# Patient Record
Sex: FEMALE | Race: WHITE | Employment: PART TIME | URBAN - METROPOLITAN AREA
[De-identification: names, ages, dates, MRNs, and addresses within clinical notes are randomized per-mention and may not be internally consistent; named-entity substitution may affect disease eponyms.]

---

## 2022-06-28 PROBLEM — R10.9 ABDOMINAL PAIN: Status: ACTIVE | Noted: 2022-06-28

## 2022-06-28 PROBLEM — F41.9 ANXIETY: Status: ACTIVE | Noted: 2022-06-28

## 2022-07-11 PROBLEM — Z87.820 HISTORY OF TRAUMATIC BRAIN INJURY: Status: ACTIVE | Noted: 2022-07-11

## 2022-07-11 PROBLEM — K91.2 POSTOPERATIVE INTESTINAL MALABSORPTION: Status: ACTIVE | Noted: 2022-07-11

## 2022-07-11 PROBLEM — Z98.84 S/P GASTRIC BYPASS: Status: ACTIVE | Noted: 2022-07-11

## 2022-07-11 PROBLEM — M79.89 BILATERAL SWELLING OF FEET: Status: ACTIVE | Noted: 2022-07-11

## 2022-07-11 PROBLEM — H53.9 VISION DISTURBANCE: Status: ACTIVE | Noted: 2022-07-11

## 2022-07-11 PROBLEM — K21.9 GASTROESOPHAGEAL REFLUX DISEASE: Status: ACTIVE | Noted: 2022-07-11

## 2022-07-18 PROBLEM — R10.10 PAIN OF UPPER ABDOMEN: Status: ACTIVE | Noted: 2022-07-18

## 2022-08-16 PROBLEM — R10.30 LOWER ABDOMINAL PAIN: Status: ACTIVE | Noted: 2022-08-16

## 2022-08-16 PROBLEM — K56.600 PARTIAL INTESTINAL OBSTRUCTION (HCC): Status: ACTIVE | Noted: 2022-08-16

## 2022-08-16 PROBLEM — R10.30 LOWER ABDOMINAL PAIN: Chronic | Status: ACTIVE | Noted: 2022-08-16

## 2022-09-06 PROBLEM — K56.600 PARTIAL INTESTINAL OBSTRUCTION (HCC): Status: RESOLVED | Noted: 2022-08-16 | Resolved: 2022-09-06

## 2022-09-06 PROBLEM — G43.709 CHRONIC MIGRAINE WITHOUT AURA WITHOUT STATUS MIGRAINOSUS, NOT INTRACTABLE: Status: ACTIVE | Noted: 2022-09-06

## 2022-09-06 PROBLEM — R10.10 PAIN OF UPPER ABDOMEN: Status: RESOLVED | Noted: 2022-07-18 | Resolved: 2022-09-06

## 2022-09-06 PROBLEM — R10.9 ABDOMINAL DISCOMFORT: Status: RESOLVED | Noted: 2022-06-28 | Resolved: 2022-09-06

## 2022-09-06 PROBLEM — F41.1 GAD (GENERALIZED ANXIETY DISORDER): Status: ACTIVE | Noted: 2022-09-06

## 2022-09-06 PROBLEM — I49.9 IRREGULAR HEART RATE: Status: ACTIVE | Noted: 2022-09-06

## 2023-06-27 ENCOUNTER — TELEPHONE (OUTPATIENT)
Dept: ADMINISTRATIVE | Age: 43
End: 2023-06-27

## 2023-07-12 PROBLEM — R00.2 PALPITATIONS: Status: ACTIVE | Noted: 2023-07-12

## 2023-08-21 ENCOUNTER — HOSPITAL ENCOUNTER (EMERGENCY)
Age: 43
Discharge: HOME | End: 2023-08-21
Attending: EMERGENCY MEDICINE
Payer: COMMERCIAL

## 2023-08-21 ENCOUNTER — APPOINTMENT (OUTPATIENT)
Dept: GENERAL RADIOLOGY | Age: 43
End: 2023-08-21
Payer: COMMERCIAL

## 2023-08-21 VITALS
DIASTOLIC BLOOD PRESSURE: 68 MMHG | TEMPERATURE: 97.7 F | OXYGEN SATURATION: 98 % | SYSTOLIC BLOOD PRESSURE: 106 MMHG | HEART RATE: 69 BPM | BODY MASS INDEX: 25.25 KG/M2 | WEIGHT: 171 LBS | RESPIRATION RATE: 20 BRPM

## 2023-08-21 DIAGNOSIS — R00.2 PALPITATIONS: ICD-10-CM

## 2023-08-21 DIAGNOSIS — R06.89 DYSPNEA AND RESPIRATORY ABNORMALITIES: Primary | ICD-10-CM

## 2023-08-21 DIAGNOSIS — I49.3 PVC (PREMATURE VENTRICULAR CONTRACTION): ICD-10-CM

## 2023-08-21 DIAGNOSIS — R06.00 DYSPNEA AND RESPIRATORY ABNORMALITIES: Primary | ICD-10-CM

## 2023-08-21 LAB
ALBUMIN SERPL-MCNC: 4.6 G/DL (ref 3.5–5.2)
ALP SERPL-CCNC: 146 U/L (ref 35–104)
ALT SERPL-CCNC: 11 U/L (ref 0–32)
AMPHET UR QL SCN: POSITIVE
AMYLASE SERPL-CCNC: 49 U/L (ref 20–100)
ANION GAP SERPL CALCULATED.3IONS-SCNC: 11 MMOL/L (ref 7–16)
AST SERPL-CCNC: 11 U/L (ref 0–31)
BARBITURATES UR QL SCN: NEGATIVE
BENZODIAZ UR QL: NEGATIVE
BILIRUB DIRECT SERPL-MCNC: 0.3 MG/DL (ref 0–0.3)
BILIRUB INDIRECT SERPL-MCNC: 0.6 MG/DL (ref 0–1)
BILIRUB SERPL-MCNC: 0.9 MG/DL (ref 0–1.2)
BILIRUB UR QL STRIP: ABNORMAL
BNP SERPL-MCNC: 72 PG/ML (ref 0–125)
BUN SERPL-MCNC: 11 MG/DL (ref 6–20)
BUPRENORPHINE UR QL: NEGATIVE
CALCIUM SERPL-MCNC: 9.8 MG/DL (ref 8.6–10.2)
CANNABINOIDS UR QL SCN: NEGATIVE
CHLORIDE SERPL-SCNC: 102 MMOL/L (ref 98–107)
CK SERPL-CCNC: 42 U/L (ref 20–180)
CLARITY UR: CLEAR
CO2 SERPL-SCNC: 26 MMOL/L (ref 22–29)
COCAINE UR QL SCN: NEGATIVE
COLOR UR: YELLOW
CREAT SERPL-MCNC: 0.9 MG/DL (ref 0.5–1)
D DIMER: 232 NG/ML DDU (ref 0–232)
EPI CELLS #/AREA URNS HPF: ABNORMAL /HPF
ERYTHROCYTE [DISTWIDTH] IN BLOOD BY AUTOMATED COUNT: 11.9 % (ref 11.5–15)
FENTANYL UR QL: NEGATIVE
GFR SERPL CREATININE-BSD FRML MDRD: >60 ML/MIN/1.73M2
GLUCOSE SERPL-MCNC: 79 MG/DL (ref 74–99)
GLUCOSE UR STRIP-MCNC: NEGATIVE MG/DL
HCG SERPL QL: NEGATIVE
HCG UR QL: NEGATIVE
HCT VFR BLD AUTO: 39.9 % (ref 34–48)
HGB BLD-MCNC: 13.5 G/DL (ref 11.5–15.5)
HGB UR QL STRIP.AUTO: NEGATIVE
INR PPP: 1.1
KETONES UR STRIP-MCNC: 40 MG/DL
LACTATE BLDV-SCNC: 0.9 MMOL/L (ref 0.5–2.2)
LEUKOCYTE ESTERASE UR QL STRIP: NEGATIVE
LIPASE SERPL-CCNC: 47 U/L (ref 13–60)
MAGNESIUM SERPL-MCNC: 2 MG/DL (ref 1.6–2.6)
MCH RBC QN AUTO: 32.8 PG (ref 26–35)
MCHC RBC AUTO-ENTMCNC: 33.8 G/DL (ref 32–34.5)
MCV RBC AUTO: 96.8 FL (ref 80–99.9)
METHADONE UR QL: NEGATIVE
MUCOUS THREADS URNS QL MICRO: PRESENT
NITRITE UR QL STRIP: NEGATIVE
OPIATES UR QL SCN: NEGATIVE
OXYCODONE UR QL SCN: NEGATIVE
PCP UR QL SCN: NEGATIVE
PH UR STRIP: 5.5 [PH] (ref 5–9)
PLATELET # BLD AUTO: 222 K/UL (ref 130–450)
PMV BLD AUTO: 10.5 FL (ref 7–12)
POTASSIUM SERPL-SCNC: 4.4 MMOL/L (ref 3.5–5)
PROT SERPL-MCNC: 7.8 G/DL (ref 6.4–8.3)
PROT UR STRIP-MCNC: NEGATIVE MG/DL
PROTHROMBIN TIME: 12.3 SEC (ref 9.3–12.4)
RBC # BLD AUTO: 4.12 M/UL (ref 3.5–5.5)
RBC #/AREA URNS HPF: ABNORMAL /HPF
SODIUM SERPL-SCNC: 139 MMOL/L (ref 132–146)
SP GR UR STRIP: >1.03 (ref 1–1.03)
TEST INFORMATION: ABNORMAL
TROPONIN I SERPL HS-MCNC: <6 NG/L (ref 0–9)
UROBILINOGEN UR STRIP-ACNC: 1 EU/DL (ref 0–1)
WBC #/AREA URNS HPF: ABNORMAL /HPF
WBC OTHER # BLD: 5.1 K/UL (ref 4.5–11.5)

## 2023-08-21 PROCEDURE — 83880 ASSAY OF NATRIURETIC PEPTIDE: CPT

## 2023-08-21 PROCEDURE — 84703 CHORIONIC GONADOTROPIN ASSAY: CPT

## 2023-08-21 PROCEDURE — 82550 ASSAY OF CK (CPK): CPT

## 2023-08-21 PROCEDURE — 85610 PROTHROMBIN TIME: CPT

## 2023-08-21 PROCEDURE — 85027 COMPLETE CBC AUTOMATED: CPT

## 2023-08-21 PROCEDURE — 80307 DRUG TEST PRSMV CHEM ANLYZR: CPT

## 2023-08-21 PROCEDURE — 71045 X-RAY EXAM CHEST 1 VIEW: CPT

## 2023-08-21 PROCEDURE — 80053 COMPREHEN METABOLIC PANEL: CPT

## 2023-08-21 PROCEDURE — 85379 FIBRIN DEGRADATION QUANT: CPT

## 2023-08-21 PROCEDURE — 82150 ASSAY OF AMYLASE: CPT

## 2023-08-21 PROCEDURE — 99285 EMERGENCY DEPT VISIT HI MDM: CPT

## 2023-08-21 PROCEDURE — 83735 ASSAY OF MAGNESIUM: CPT

## 2023-08-21 PROCEDURE — 83605 ASSAY OF LACTIC ACID: CPT

## 2023-08-21 PROCEDURE — 81001 URINALYSIS AUTO W/SCOPE: CPT

## 2023-08-21 PROCEDURE — 83690 ASSAY OF LIPASE: CPT

## 2023-08-21 PROCEDURE — 93005 ELECTROCARDIOGRAM TRACING: CPT | Performed by: EMERGENCY MEDICINE

## 2023-08-21 PROCEDURE — 84484 ASSAY OF TROPONIN QUANT: CPT

## 2023-08-21 PROCEDURE — 82248 BILIRUBIN DIRECT: CPT

## 2023-08-21 ASSESSMENT — PAIN DESCRIPTION - ORIENTATION: ORIENTATION: RIGHT

## 2023-08-21 ASSESSMENT — PAIN SCALES - GENERAL: PAINLEVEL_OUTOF10: 3

## 2023-08-21 ASSESSMENT — PAIN DESCRIPTION - LOCATION: LOCATION: CHEST

## 2023-08-21 ASSESSMENT — PAIN - FUNCTIONAL ASSESSMENT: PAIN_FUNCTIONAL_ASSESSMENT: 0-10

## 2023-08-21 ASSESSMENT — PAIN DESCRIPTION - DESCRIPTORS: DESCRIPTORS: DISCOMFORT;ACHING;PRESSURE

## 2023-08-22 LAB
EKG ATRIAL RATE: 63 BPM
EKG P AXIS: 46 DEGREES
EKG P-R INTERVAL: 146 MS
EKG Q-T INTERVAL: 438 MS
EKG QRS DURATION: 78 MS
EKG QTC CALCULATION (BAZETT): 448 MS
EKG R AXIS: 61 DEGREES
EKG T AXIS: 57 DEGREES
EKG VENTRICULAR RATE: 63 BPM

## 2023-08-22 PROCEDURE — 93010 ELECTROCARDIOGRAM REPORT: CPT | Performed by: INTERNAL MEDICINE

## 2023-08-22 NOTE — ED PROVIDER NOTES
HPI:  8/21/23, Time: 9:32 PM EDT         Latasha Morris is a 40 y.o. female presenting to the ED for dyspnea and PVCs which is not new, beginning months ago. The complaint has been intermittent, mild in severity, and worsened by nothing. Patient is not having any chest pain symptoms that she gets lightheaded with the PVCs this is not new she is scheduled for an ablation she never felt her heart racing did not actually pass out did not have any shortness of breath no nausea vomiting no leg swelling her EKG and lab work was all normal vital signs were normal I told her she needs to follow-up with her cardiologist soon as possible return immediately for her heart rate is very fast that she actually feels like she is get a pass out with with diaphoresis or lightheadedness when she did not actually have once again this is not a new problem    ROS:   Pertinent positives and negatives are stated within HPI, all other systems reviewed and are negative.  --------------------------------------------- PAST HISTORY ---------------------------------------------  Past Medical History:  has a past medical history of Abdominal pain, ADD (attention deficit disorder), Anxiety, Arthritis, Difficulty sleeping, Fatigue, Joint pain, knee, Memory difficulty, Migraine, Partial intestinal obstruction ([address]), PTSD (post-traumatic stress disorder), and Skin cancer. Past Surgical History:  has a past surgical history that includes Gastric bypass surgery (05/29/2018); Hysterectomy, vaginal (2019); Gastric fundoplication (0589); Ankle surgery (Right); Cholecystectomy, laparoscopic; Vaginal prolapse repair (2010); other surgical history (2011); Esophagogastroduodenoscopy (07/18/2022); Hysterectomy; Breast biopsy; skin biopsy; Tonsillectomy; and laparoscopy (08/16/2022). Social History:  reports that she has never smoked. She has never used smokeless tobacco. She reports current alcohol use.  She reports that she does not currently use

## 2023-08-22 NOTE — DISCHARGE INSTRUCTIONS
Chest pain shortness of breath or increased heart rate follow-up with a cardiologist as soon as possible or family treatment a pass out or any kind of near syncope or syncope no driving going to high places or operating machinery until [facility] by

## 2024-05-03 DIAGNOSIS — K90.9 INTESTINAL MALABSORPTION, UNSPECIFIED TYPE (HHS-HCC): ICD-10-CM

## 2024-05-17 ENCOUNTER — LAB (OUTPATIENT)
Dept: LAB | Facility: LAB | Age: 44
End: 2024-05-17
Payer: COMMERCIAL

## 2024-05-17 DIAGNOSIS — K90.9 INTESTINAL MALABSORPTION, UNSPECIFIED TYPE (HHS-HCC): ICD-10-CM

## 2024-05-17 LAB
25(OH)D3 SERPL-MCNC: 22 NG/ML (ref 30–100)
ALBUMIN SERPL BCP-MCNC: 3.8 G/DL (ref 3.4–5)
ALP SERPL-CCNC: 105 U/L (ref 33–110)
ALT SERPL W P-5'-P-CCNC: 10 U/L (ref 7–45)
ANION GAP SERPL CALC-SCNC: 9 MMOL/L (ref 10–20)
AST SERPL W P-5'-P-CCNC: 12 U/L (ref 9–39)
BASOPHILS # BLD AUTO: 0.02 X10*3/UL (ref 0–0.1)
BASOPHILS NFR BLD AUTO: 0.6 %
BILIRUB SERPL-MCNC: 0.4 MG/DL (ref 0–1.2)
BUN SERPL-MCNC: 8 MG/DL (ref 6–23)
CALCIUM SERPL-MCNC: 8.5 MG/DL (ref 8.6–10.3)
CHLORIDE SERPL-SCNC: 110 MMOL/L (ref 98–107)
CO2 SERPL-SCNC: 24 MMOL/L (ref 21–32)
CREAT SERPL-MCNC: 0.74 MG/DL (ref 0.5–1.05)
EGFRCR SERPLBLD CKD-EPI 2021: >90 ML/MIN/1.73M*2
EOSINOPHIL # BLD AUTO: 0.11 X10*3/UL (ref 0–0.7)
EOSINOPHIL NFR BLD AUTO: 3.2 %
ERYTHROCYTE [DISTWIDTH] IN BLOOD BY AUTOMATED COUNT: 12.2 % (ref 11.5–14.5)
FERRITIN SERPL-MCNC: 103 NG/ML (ref 8–150)
FOLATE SERPL-MCNC: 17 NG/ML
GLUCOSE SERPL-MCNC: 82 MG/DL (ref 74–99)
HCT VFR BLD AUTO: 36.4 % (ref 36–46)
HGB BLD-MCNC: 12.2 G/DL (ref 12–16)
IMM GRANULOCYTES # BLD AUTO: 0 X10*3/UL (ref 0–0.7)
IMM GRANULOCYTES NFR BLD AUTO: 0 % (ref 0–0.9)
IRON SATN MFR SERPL: 6 % (ref 25–45)
IRON SERPL-MCNC: 16 UG/DL (ref 35–150)
LYMPHOCYTES # BLD AUTO: 1.07 X10*3/UL (ref 1.2–4.8)
LYMPHOCYTES NFR BLD AUTO: 31.5 %
MCH RBC QN AUTO: 33 PG (ref 26–34)
MCHC RBC AUTO-ENTMCNC: 33.5 G/DL (ref 32–36)
MCV RBC AUTO: 98 FL (ref 80–100)
MONOCYTES # BLD AUTO: 0.29 X10*3/UL (ref 0.1–1)
MONOCYTES NFR BLD AUTO: 8.5 %
NEUTROPHILS # BLD AUTO: 1.91 X10*3/UL (ref 1.2–7.7)
NEUTROPHILS NFR BLD AUTO: 56.2 %
NRBC BLD-RTO: 0 /100 WBCS (ref 0–0)
PLATELET # BLD AUTO: 151 X10*3/UL (ref 150–450)
POTASSIUM SERPL-SCNC: 4.4 MMOL/L (ref 3.5–5.3)
PROT SERPL-MCNC: 6.2 G/DL (ref 6.4–8.2)
PTH-INTACT SERPL-MCNC: 42.4 PG/ML (ref 18.5–88)
RBC # BLD AUTO: 3.7 X10*6/UL (ref 4–5.2)
SODIUM SERPL-SCNC: 139 MMOL/L (ref 136–145)
TIBC SERPL-MCNC: 278 UG/DL (ref 240–445)
UIBC SERPL-MCNC: 262 UG/DL (ref 110–370)
VIT B12 SERPL-MCNC: 326 PG/ML (ref 211–911)
WBC # BLD AUTO: 3.4 X10*3/UL (ref 4.4–11.3)

## 2024-05-17 PROCEDURE — 82607 VITAMIN B-12: CPT

## 2024-05-17 PROCEDURE — 84630 ASSAY OF ZINC: CPT

## 2024-05-17 PROCEDURE — 80053 COMPREHEN METABOLIC PANEL: CPT

## 2024-05-17 PROCEDURE — 83970 ASSAY OF PARATHORMONE: CPT

## 2024-05-17 PROCEDURE — 83550 IRON BINDING TEST: CPT

## 2024-05-17 PROCEDURE — 85025 COMPLETE CBC W/AUTO DIFF WBC: CPT

## 2024-05-17 PROCEDURE — 82525 ASSAY OF COPPER: CPT

## 2024-05-17 PROCEDURE — 36415 COLL VENOUS BLD VENIPUNCTURE: CPT

## 2024-05-17 PROCEDURE — 84425 ASSAY OF VITAMIN B-1: CPT

## 2024-05-17 PROCEDURE — 82306 VITAMIN D 25 HYDROXY: CPT

## 2024-05-17 PROCEDURE — 82746 ASSAY OF FOLIC ACID SERUM: CPT

## 2024-05-17 PROCEDURE — 82728 ASSAY OF FERRITIN: CPT

## 2024-05-17 PROCEDURE — 83540 ASSAY OF IRON: CPT

## 2024-05-20 LAB
COPPER SERPL-MCNC: 104.9 UG/DL (ref 80–155)
ZINC SERPL-MCNC: 70.2 UG/DL (ref 60–120)

## 2024-05-22 LAB — VIT B1 PYROPHOSHATE BLD-SCNC: 81 NMOL/L (ref 70–180)

## 2024-05-31 ENCOUNTER — OFFICE VISIT (OUTPATIENT)
Dept: SURGERY | Facility: CLINIC | Age: 44
End: 2024-05-31
Payer: COMMERCIAL

## 2024-05-31 ENCOUNTER — NUTRITION (OUTPATIENT)
Dept: SURGERY | Facility: CLINIC | Age: 44
End: 2024-05-31

## 2024-05-31 DIAGNOSIS — E53.8 VITAMIN B12 DEFICIENCY: ICD-10-CM

## 2024-05-31 DIAGNOSIS — K90.9 INTESTINAL MALABSORPTION, UNSPECIFIED TYPE (HHS-HCC): Primary | ICD-10-CM

## 2024-05-31 DIAGNOSIS — Z98.84 H/O BARIATRIC SURGERY: ICD-10-CM

## 2024-05-31 DIAGNOSIS — E61.1 IRON DEFICIENCY: ICD-10-CM

## 2024-05-31 DIAGNOSIS — E21.3 HYPERPARATHYROIDISM (MULTI): ICD-10-CM

## 2024-05-31 DIAGNOSIS — E55.9 VITAMIN D DEFICIENCY: ICD-10-CM

## 2024-05-31 PROCEDURE — 99204 OFFICE O/P NEW MOD 45 MIN: CPT

## 2024-05-31 PROCEDURE — 96372 THER/PROPH/DIAG INJ SC/IM: CPT

## 2024-05-31 RX ORDER — ONDANSETRON 4 MG/1
4 TABLET, FILM COATED ORAL EVERY 8 HOURS PRN
COMMUNITY

## 2024-05-31 RX ORDER — FLECAINIDE ACETATE 50 MG/1
TABLET ORAL
COMMUNITY

## 2024-05-31 RX ORDER — TOPIRAMATE 50 MG/1
50 TABLET, FILM COATED ORAL 2 TIMES DAILY
COMMUNITY

## 2024-05-31 RX ORDER — CYANOCOBALAMIN 1000 UG/ML
1000 INJECTION, SOLUTION INTRAMUSCULAR; SUBCUTANEOUS ONCE
Status: COMPLETED | OUTPATIENT
Start: 2024-05-31 | End: 2024-05-31

## 2024-05-31 RX ADMIN — CYANOCOBALAMIN 1000 MCG: 1000 INJECTION, SOLUTION INTRAMUSCULAR; SUBCUTANEOUS at 12:08

## 2024-05-31 ASSESSMENT — LIFESTYLE VARIABLES
HOW MANY STANDARD DRINKS CONTAINING ALCOHOL DO YOU HAVE ON A TYPICAL DAY: 1 OR 2
AUDIT-C TOTAL SCORE: 1
HOW OFTEN DO YOU HAVE A DRINK CONTAINING ALCOHOL: MONTHLY OR LESS
HOW OFTEN DO YOU HAVE SIX OR MORE DRINKS ON ONE OCCASION: NEVER
SKIP TO QUESTIONS 9-10: 1

## 2024-05-31 ASSESSMENT — PAIN SCALES - GENERAL: PAINLEVEL: 0-NO PAIN

## 2024-05-31 NOTE — PROGRESS NOTES
Bariatric Post-Op Follow Up Appointment: 6 yr RYGB    Current Weight: 161 lb  Current BMI: 24.30 (WNR)  Percentage of weight loss achieved: 103     Phyllis is present for her 6 yr postoperative follow up appt. She reports having frequent symptoms of hyporeactive hypoglycemia. Her symptoms include feeling anxious, heart racing, shaking, sweating, confusion and sometimes not being able to see. She reports that she used to be on Trulicity which helped to stabilize her blood sugar, however had to stop the medication due to insurance change. She also reports having low hunger since after surgery. Her diet recall consists of:  Breakfast- either skips, or more recently having 2 hard boiled eggs.   Lunch (at 1-2 pm)- mostly eating out such as a [employer] a wrap or grilled chicken nuggets, or 1/2 a roast beef sandwich, or mozzarella sticks   Dinner- scrambled eggs   Volume of food at a time: sometimes just a few bites.   Snacks: maybe a couple of crackers. Pt notes snacking is not regularly due to low hunger.   Beverages: a medium [employer] shaken espresso with vanilla sweet cream and toffeenut syrup everyday (provides 100 kcal, 14g sugar), propel, sugar-free energy drinks   Alcohol intake: socially.   Do you drink with your meals? Yes due to food sticking per pt.  Current Exercise: none currently.   Vitamins/minerals: Vitamin D. Reviewed vitamin education with CARMINA Alex.   Food intolerances? None specifically.   Any decrease in energy levels? Not addressed.   Any questions or concerns? hyporeactive hypoglycemia. I provided an updated bariatric nutrition guide and we discussed the importance of having balanced meals, as well as smaller more frequent meals throughout the day. Choices could include: greek yogurt, cottage cheese, a protein bar, a protein shake, a chomp bar, a cheese stick, etc. I recommended that Phyllis strive to eat a balanced meal/snack every 2-3 hours.     Plan: pt will follow up in 3 months.         Janessa  MARGOT Mckeon RD, LDN  Registered Dietitian, Licensed Dietitian Nutritionist

## 2024-05-31 NOTE — PROGRESS NOTES
Subjective   Patient ID: Phyllis Baldwin is a 43 y.o. female who presents for Follow-up (6 yrs fuv rygb).  LENA  Phyllis is here for her 6 year follow up after a RYGB. She has not been seen for follow up since 2020. She has maintained 103% excess weight loss. She had to switch to care at [facility] due to an insurance change. She was found to have a small bowel obstruction. She tells me that she had heart surgery last year. She tells me that the weight management providers at [facility] put her on trulicity to help manage her weight and her hypoglycemia events. At the beginning of the year, her insurance no longer covered the medication. She tells me that she may eat 2-3 times per day. She does not feel hunger. Her meal volume is still very small. Her cadiologist wants her to increase her salt intake. She is taking a once weekly vitamin D. She does not take any other supplementation. She tells me that she had a TBI in 2015 and it is hard for her to remember to take supplementation. She had labwork for this appointment, which we reviewed. Phyllis also brought her labwork collected at [facility] 1 month prior to compare.     Objective   Physical Exam  Constitutional:       Appearance: Normal appearance.   Eyes:      Pupils: Pupils are equal, round, and reactive to light.   Cardiovascular:      Rate and Rhythm: Normal rate.   Pulmonary:      Effort: Pulmonary effort is normal.   Abdominal:      Palpations: Abdomen is soft.   Musculoskeletal:         General: Normal range of motion.   Skin:     General: Skin is warm and dry.   Neurological:      General: No focal deficit present.      Mental Status: She is alert and oriented to person, place, and time.   Psychiatric:         Mood and Affect: Mood normal.         Assessment/Plan     Phyllis and I reviewed the proper supplementation after Bariatric surgery. She was given a vitamin handout. She will continue her weekly vitamin D, as it has increased 10 points in the month she has been taking  it. She will obtain a vitamin B12 injection today. I encouraged her to take an oral B12 or get injections from her [facility] providers. Phyllis would like to start oral ferrous fumarate. She will start 45mg daily. We will recheck her vitamin and iron levels in 3 months. I reminded her that a RYGB is not compatible with ASA or NSAID use. I recommended she follow up with endocrine and her providers at [facility] to see if they could get her medication covered as they have been following with her longer and would have more information to provide to her insurance company.     Arielle Ho, ALONSO-CNP 05/31/24 10:49 AM

## 2024-05-31 NOTE — PROGRESS NOTES
Subjective   Patient ID: Phyllis Baldwin is a 43 y.o. female who presents for No chief complaint on file..  HPI  7 yr fuv rygb  Blood sugar goes low a lot/   START WT:  295  IDEAL WT: 165    START EXCESS:  130   TOTAL WT LOSS: 134   EWL:    103  % HT: 68.25    IN.  Sees np on demand in [place]  Review of Systems  Bariatric Surgery F/U:          Seen at ER after surgery? yes, abdominal pain, had food stuck  Hospital admission? No.  Bariatric reoperation? No.  Bariatric intervention? No.  Was anticoagulation initiated for presumed/confirmed Vein Thrombosis/PE? No.  Was an incisional hernia noted on exam? No.  Sleep Apnea? No.  Still using C-PAP? No.  GERD req. meds? No.  Hyperlipidemia? No.  Still taking Cholesterol med? No.  Hypertension? No.  Still taking HTN med? No.  Number of Anti-hypertensive Medications: 0.  Diabetes? No.  Still taking DM med? No.  Current DM medication type: _____.         CONSTITUTIONAL:          Chills No.  Fatigue yes  Fever No.         CARDIOLOGY:          admits to  dizziness, chest pain, admits to palpitations, admits to shortness of breath.         RESPIRATORY:          Negative for chest congestion, cough, wheezing.         GASTROENTEROLOGY:          Food Intolerance No.  Acid reflux No.  Abdominal pain No.  Black stools No.  Constipation No.  Diarrhea No.  Loss of appetite no.  Nausea No.  Vomiting No.         UROLOGY:          Negative for dysuria, urinary urgency, kidney stones.         PSYCHOLOGY:          Negative for depression, anxiety, high stress level.   Objective   Physical Exam    Assessment/Plan            Gale Marroquin LPN 05/31/24 10:11 AM

## 2024-06-03 VITALS
DIASTOLIC BLOOD PRESSURE: 50 MMHG | HEART RATE: 64 BPM | HEIGHT: 68 IN | BODY MASS INDEX: 24.4 KG/M2 | SYSTOLIC BLOOD PRESSURE: 104 MMHG | WEIGHT: 161 LBS

## 2024-06-03 PROBLEM — E21.3 HYPERPARATHYROIDISM (MULTI): Status: ACTIVE | Noted: 2024-06-03

## 2024-06-03 PROBLEM — E55.9 VITAMIN D DEFICIENCY: Status: ACTIVE | Noted: 2024-06-03

## 2024-06-03 PROBLEM — K90.9 INTESTINAL MALABSORPTION (HHS-HCC): Status: ACTIVE | Noted: 2024-06-03

## 2024-06-03 PROBLEM — Z98.84 H/O BARIATRIC SURGERY: Status: ACTIVE | Noted: 2024-06-03

## 2024-06-03 PROBLEM — E61.1 IRON DEFICIENCY: Status: ACTIVE | Noted: 2024-06-03

## 2024-06-03 ASSESSMENT — COLUMBIA-SUICIDE SEVERITY RATING SCALE - C-SSRS
2. HAVE YOU ACTUALLY HAD ANY THOUGHTS OF KILLING YOURSELF?: NO
6. HAVE YOU EVER DONE ANYTHING, STARTED TO DO ANYTHING, OR PREPARED TO DO ANYTHING TO END YOUR LIFE?: NO
1. IN THE PAST MONTH, HAVE YOU WISHED YOU WERE DEAD OR WISHED YOU COULD GO TO SLEEP AND NOT WAKE UP?: NO

## 2024-06-03 ASSESSMENT — PATIENT HEALTH QUESTIONNAIRE - PHQ9
2. FEELING DOWN, DEPRESSED OR HOPELESS: NOT AT ALL
1. LITTLE INTEREST OR PLEASURE IN DOING THINGS: NOT AT ALL
SUM OF ALL RESPONSES TO PHQ9 QUESTIONS 1 AND 2: 0

## 2024-08-27 ENCOUNTER — APPOINTMENT (OUTPATIENT)
Dept: SURGERY | Facility: CLINIC | Age: 44
End: 2024-08-27
Payer: COMMERCIAL

## 2025-01-28 ENCOUNTER — APPOINTMENT (OUTPATIENT)
Dept: GENERAL RADIOLOGY | Age: 45
End: 2025-01-28
Payer: COMMERCIAL

## 2025-01-28 ENCOUNTER — APPOINTMENT (OUTPATIENT)
Dept: CT IMAGING | Age: 45
End: 2025-01-28
Payer: COMMERCIAL

## 2025-01-28 ENCOUNTER — HOSPITAL ENCOUNTER (EMERGENCY)
Age: 45
Discharge: HOME | End: 2025-01-28
Attending: EMERGENCY MEDICINE
Payer: COMMERCIAL

## 2025-01-28 VITALS
RESPIRATION RATE: 14 BRPM | TEMPERATURE: 97.3 F | DIASTOLIC BLOOD PRESSURE: 58 MMHG | BODY MASS INDEX: 23.25 KG/M2 | SYSTOLIC BLOOD PRESSURE: 105 MMHG | HEIGHT: 69 IN | WEIGHT: 157 LBS | OXYGEN SATURATION: 100 % | HEART RATE: 78 BPM

## 2025-01-28 DIAGNOSIS — R00.2 PALPITATIONS: Primary | ICD-10-CM

## 2025-01-28 LAB
ALBUMIN SERPL-MCNC: 4.4 G/DL (ref 3.5–5.2)
ALP SERPL-CCNC: 124 U/L (ref 35–104)
ALT SERPL-CCNC: 10 U/L (ref 0–32)
ANION GAP SERPL CALCULATED.3IONS-SCNC: 12 MMOL/L (ref 7–16)
AST SERPL-CCNC: 13 U/L (ref 0–31)
BASOPHILS # BLD: 0.04 K/UL (ref 0–0.2)
BASOPHILS NFR BLD: 1 % (ref 0–2)
BILIRUB SERPL-MCNC: 1 MG/DL (ref 0–1.2)
BUN SERPL-MCNC: 9 MG/DL (ref 6–20)
CALCIUM SERPL-MCNC: 9.2 MG/DL (ref 8.6–10.2)
CHLORIDE SERPL-SCNC: 103 MMOL/L (ref 98–107)
CO2 SERPL-SCNC: 23 MMOL/L (ref 22–29)
CREAT SERPL-MCNC: 0.8 MG/DL (ref 0.5–1)
D-DIMER QUANTITATIVE: 711 NG/ML DDU (ref 0–230)
EKG ATRIAL RATE: 66 BPM
EKG P AXIS: 40 DEGREES
EKG P-R INTERVAL: 128 MS
EKG Q-T INTERVAL: 418 MS
EKG QRS DURATION: 86 MS
EKG QTC CALCULATION (BAZETT): 438 MS
EKG R AXIS: 60 DEGREES
EKG T AXIS: 49 DEGREES
EKG VENTRICULAR RATE: 66 BPM
EOSINOPHIL # BLD: 0.07 K/UL (ref 0.05–0.5)
EOSINOPHILS RELATIVE PERCENT: 1 % (ref 0–6)
ERYTHROCYTE [DISTWIDTH] IN BLOOD BY AUTOMATED COUNT: 11.9 % (ref 11.5–15)
GFR, ESTIMATED: >90 ML/MIN/1.73M2
GLUCOSE SERPL-MCNC: 78 MG/DL (ref 74–99)
HCT VFR BLD AUTO: 37.9 % (ref 34–48)
HGB BLD-MCNC: 12.5 G/DL (ref 11.5–15.5)
IMM GRANULOCYTES # BLD AUTO: <0.03 K/UL (ref 0–0.58)
IMM GRANULOCYTES NFR BLD: 0 % (ref 0–5)
INFLUENZA A BY PCR: NOT DETECTED
INFLUENZA B BY PCR: NOT DETECTED
LYMPHOCYTES NFR BLD: 1.97 K/UL (ref 1.5–4)
LYMPHOCYTES RELATIVE PERCENT: 33 % (ref 20–42)
MCH RBC QN AUTO: 32.6 PG (ref 26–35)
MCHC RBC AUTO-ENTMCNC: 33 G/DL (ref 32–34.5)
MCV RBC AUTO: 98.7 FL (ref 80–99.9)
MONOCYTES NFR BLD: 0.32 K/UL (ref 0.1–0.95)
MONOCYTES NFR BLD: 5 % (ref 2–12)
NEUTROPHILS NFR BLD: 60 % (ref 43–80)
NEUTS SEG NFR BLD: 3.57 K/UL (ref 1.8–7.3)
PLATELET # BLD AUTO: 187 K/UL (ref 130–450)
PMV BLD AUTO: 10.7 FL (ref 7–12)
POTASSIUM SERPL-SCNC: 3.9 MMOL/L (ref 3.5–5)
PROT SERPL-MCNC: 7.6 G/DL (ref 6.4–8.3)
RBC # BLD AUTO: 3.84 M/UL (ref 3.5–5.5)
SARS-COV-2 RDRP RESP QL NAA+PROBE: NOT DETECTED
SODIUM SERPL-SCNC: 138 MMOL/L (ref 132–146)
SPECIMEN DESCRIPTION: NORMAL
TROPONIN I SERPL HS-MCNC: <6 NG/L (ref 0–9)
TROPONIN I SERPL HS-MCNC: <6 NG/L (ref 0–9)
TSH SERPL DL<=0.05 MIU/L-ACNC: 2.54 UIU/ML (ref 0.27–4.2)
WBC OTHER # BLD: 6 K/UL (ref 4.5–11.5)

## 2025-01-28 PROCEDURE — 84484 ASSAY OF TROPONIN QUANT: CPT

## 2025-01-28 PROCEDURE — 84443 ASSAY THYROID STIM HORMONE: CPT

## 2025-01-28 PROCEDURE — 2580000003 HC RX 258

## 2025-01-28 PROCEDURE — 87502 INFLUENZA DNA AMP PROBE: CPT

## 2025-01-28 PROCEDURE — 93010 ELECTROCARDIOGRAM REPORT: CPT | Performed by: INTERNAL MEDICINE

## 2025-01-28 PROCEDURE — 6360000004 HC RX CONTRAST MEDICATION: Performed by: RADIOLOGY

## 2025-01-28 PROCEDURE — 85379 FIBRIN DEGRADATION QUANT: CPT

## 2025-01-28 PROCEDURE — 96361 HYDRATE IV INFUSION ADD-ON: CPT

## 2025-01-28 PROCEDURE — 87635 SARS-COV-2 COVID-19 AMP PRB: CPT

## 2025-01-28 PROCEDURE — 93005 ELECTROCARDIOGRAM TRACING: CPT

## 2025-01-28 PROCEDURE — 99285 EMERGENCY DEPT VISIT HI MDM: CPT

## 2025-01-28 PROCEDURE — 80053 COMPREHEN METABOLIC PANEL: CPT

## 2025-01-28 PROCEDURE — 71275 CT ANGIOGRAPHY CHEST: CPT

## 2025-01-28 PROCEDURE — 96360 HYDRATION IV INFUSION INIT: CPT

## 2025-01-28 PROCEDURE — 71045 X-RAY EXAM CHEST 1 VIEW: CPT

## 2025-01-28 PROCEDURE — 85025 COMPLETE CBC W/AUTO DIFF WBC: CPT

## 2025-01-28 RX ORDER — 0.9 % SODIUM CHLORIDE 0.9 %
500 INTRAVENOUS SOLUTION INTRAVENOUS ONCE
Status: COMPLETED | OUTPATIENT
Start: 2025-01-28 | End: 2025-01-28

## 2025-01-28 RX ORDER — IOPAMIDOL 755 MG/ML
75 INJECTION, SOLUTION INTRAVASCULAR
Status: COMPLETED | OUTPATIENT
Start: 2025-01-28 | End: 2025-01-28

## 2025-01-28 RX ADMIN — IOPAMIDOL 75 ML: 755 INJECTION, SOLUTION INTRAVENOUS at 13:21

## 2025-01-28 RX ADMIN — SODIUM CHLORIDE 500 ML: 9 INJECTION, SOLUTION INTRAVENOUS at 10:58

## 2025-01-28 ASSESSMENT — PAIN - FUNCTIONAL ASSESSMENT: PAIN_FUNCTIONAL_ASSESSMENT: NONE - DENIES PAIN

## 2025-01-28 NOTE — ED PROVIDER NOTES
[facility] EMERGENCY DEPARTMENT  EMERGENCY DEPARTMENT ENCOUNTER        Pt Name: Cee Watts  MRN: [id number]  Birthdate 1980  Date of evaluation: 1/28/2025  Provider: Cassandra Jimenez DO  PCP: Elisa Levin APRN - CNP  Note Started: 10:02 AM EST 1/28/25    CHIEF COMPLAINT       Chief Complaint   Patient presents with    Shortness of Breath    Palpitations     States while a work heart rate was racing and felt palpitations, currently denies pain & palpitations       HISTORY OF PRESENT ILLNESS: 1 or more Elements   Cee Watts is a 44 y.o. female with a PMHX of symptomatic PVCs and cardiac ablation who presents to the emergency department with chief complaint of shortness of breath and palpitations.  She was at work this morning and notes that she was feeling a bit lightheaded and can feel her heart racing.  Her apple watch was alerting her that she was potentially in atrial fibrillation.  Patient notes that since yesterday she has felt off but is unable to really pinpoint what is felt unwell about her.  States that she has been hydrating appropriately.  States she flew to [facility] this past weekend.  Otherwise is denying lower extremity edema, CP, nausea, vomiting, abdominal pain, fevers, chills, syncope, or headaches.    Nursing Notes were all reviewed and agreed with or any disagreements were addressed in the HPI.    REVIEW OF SYSTEMS :    Positives and Pertinent negatives as per HPI.    PAST MEDICAL HISTORY/Chronic Conditions Affecting Care    has a past medical history of Abdominal pain, ADD (attention deficit disorder), Anxiety, Arthritis, Difficulty sleeping, Fatigue, Joint pain, knee, Memory difficulty, Migraine, Partial intestinal obstruction (HCC) (8/16/2022), PTSD (post-traumatic stress disorder), and Skin cancer.     SURGICAL HISTORY     Past Surgical History:   Procedure Laterality Date    ANKLE SURGERY Right     x3    BREAST BIOPSY      CHOLECYSTECTOMY,